# Patient Record
Sex: FEMALE | Race: WHITE | URBAN - NONMETROPOLITAN AREA
[De-identification: names, ages, dates, MRNs, and addresses within clinical notes are randomized per-mention and may not be internally consistent; named-entity substitution may affect disease eponyms.]

---

## 2020-10-02 ENCOUNTER — OFFICE VISIT (OUTPATIENT)
Dept: PRIMARY CARE CLINIC | Age: 55
End: 2020-10-02
Payer: COMMERCIAL

## 2020-10-02 VITALS
HEART RATE: 68 BPM | TEMPERATURE: 98.6 F | WEIGHT: 184.4 LBS | HEIGHT: 63 IN | SYSTOLIC BLOOD PRESSURE: 116 MMHG | DIASTOLIC BLOOD PRESSURE: 78 MMHG | BODY MASS INDEX: 32.67 KG/M2

## 2020-10-02 PROCEDURE — 87880 STREP A ASSAY W/OPTIC: CPT | Performed by: NURSE PRACTITIONER

## 2020-10-02 PROCEDURE — 99203 OFFICE O/P NEW LOW 30 MIN: CPT | Performed by: NURSE PRACTITIONER

## 2020-10-02 PROCEDURE — 96372 THER/PROPH/DIAG INJ SC/IM: CPT | Performed by: NURSE PRACTITIONER

## 2020-10-02 RX ORDER — CHOLECALCIFEROL (VITAMIN D3) 125 MCG
CAPSULE ORAL
COMMUNITY
Start: 2020-07-22

## 2020-10-02 RX ORDER — ALPRAZOLAM 0.25 MG/1
TABLET ORAL
COMMUNITY
Start: 2020-09-17

## 2020-10-02 RX ORDER — AZITHROMYCIN 500 MG/1
500 TABLET, FILM COATED ORAL DAILY
Qty: 5 TABLET | Refills: 0 | Status: SHIPPED | OUTPATIENT
Start: 2020-10-02 | End: 2020-10-07

## 2020-10-02 RX ORDER — AZITHROMYCIN 500 MG/1
500 TABLET, FILM COATED ORAL DAILY
Qty: 5 TABLET | Refills: 0 | Status: SHIPPED | OUTPATIENT
Start: 2020-10-02 | End: 2020-10-02

## 2020-10-02 RX ORDER — TRIAMCINOLONE ACETONIDE 40 MG/ML
40 INJECTION, SUSPENSION INTRA-ARTICULAR; INTRAMUSCULAR ONCE
Status: COMPLETED | OUTPATIENT
Start: 2020-10-02 | End: 2020-10-02

## 2020-10-02 RX ORDER — DIVALPROEX SODIUM 500 MG/1
TABLET, DELAYED RELEASE ORAL
COMMUNITY
Start: 2020-09-29

## 2020-10-02 RX ORDER — ATENOLOL 25 MG/1
TABLET ORAL
COMMUNITY
Start: 2020-09-06

## 2020-10-02 RX ORDER — ESCITALOPRAM OXALATE 20 MG/1
TABLET ORAL
COMMUNITY
Start: 2020-09-16

## 2020-10-02 RX ADMIN — TRIAMCINOLONE ACETONIDE 40 MG: 40 INJECTION, SUSPENSION INTRA-ARTICULAR; INTRAMUSCULAR at 10:08

## 2020-10-02 ASSESSMENT — ENCOUNTER SYMPTOMS
CHEST TIGHTNESS: 0
SHORTNESS OF BREATH: 0
SWOLLEN GLANDS: 1
COUGH: 1
ABDOMINAL PAIN: 0
VOMITING: 0
SORE THROAT: 1

## 2020-10-02 ASSESSMENT — PATIENT HEALTH QUESTIONNAIRE - PHQ9
SUM OF ALL RESPONSES TO PHQ9 QUESTIONS 1 & 2: 0
SUM OF ALL RESPONSES TO PHQ QUESTIONS 1-9: 0
SUM OF ALL RESPONSES TO PHQ QUESTIONS 1-9: 0
1. LITTLE INTEREST OR PLEASURE IN DOING THINGS: 0
DEPRESSION UNABLE TO ASSESS: PT REFUSES
2. FEELING DOWN, DEPRESSED OR HOPELESS: 0

## 2020-10-02 NOTE — PATIENT INSTRUCTIONS
New medication added today and was advised on the risks and benefits of the medication. Pt was instructed on the proper use/technique of the medication. Pt agrees to try the new medication as prescribed and feels able to comply with treatment plan today. No barriers for treatment plan found today. Pt will let us know if any difficulty obtaining new medication or with any other concerns. SYMPTOMATIC COVID SCREEN . You were screened for COVID today and swabbed. You will be called with the results and any indicated treatments. You were provided with written CDC isolation/quarantine guidelines. Steroid INJ today for rash and cough as discussed. FU with your PCP in your hometown.

## 2020-10-02 NOTE — PROGRESS NOTES
[facility] [address] [facility]  Dept: [phone]  Dept Fax: [phone]  Loc: [phone]    Ita Etienne is a 50 y.o. female who presents today for her medical conditions/complaintsas noted below. Ita Etienne is c/o of Pharyngitis (x 4 days); Congestion (x 4 days); and Skin Problem (Patient states she has muliple lesion on Left shoulder x 1 week)      HPI:   Patient notes is here from out of town taking care of her mother, notes congestion, sinus pain, rash over left shoulder. Afebrile, notes has worsened over this week. Notes cough is burning, agreeable to Covid  testing. Pharyngitis   This is a new problem. The current episode started in the past 7 days. The problem has been gradually worsening. Associated symptoms include chills, congestion, coughing, fatigue, a fever, headaches, myalgias, a rash, a sore throat and swollen glands. Pertinent negatives include no abdominal pain, chest pain or vomiting. Nothing aggravates the symptoms. She has tried nothing for the symptoms. The treatment provided no relief. History reviewed. No pertinent past medical history. History reviewed. No pertinent surgical history. History reviewed. No pertinent family history. Social History     Tobacco Use    Smoking status: Not on file    Smokeless tobacco: Former User   Substance Use Topics    Alcohol use: Not on file      Current Outpatient Medications on File Prior to Visit   Medication Sig Dispense Refill    ALPRAZolam (XANAX) 0.25 MG tablet TAKE 1 TABLET BY MOUTH AT BEDTIME      atenolol (TENORMIN) 25 MG tablet TAKE 1 TABLET BY MOUTH EVERY DAY FOR TREMORS      [employer] D3 125 MCG (5000 UT) CAPS capsule TAKE 1 CAPSULE BY MOUTH EVERY DAY      escitalopram (LEXAPRO) 20 MG tablet TAKE 1 TABLET BY MOUTH EVERY DAY      divalproex (DEPAKOTE) 500 MG DR tablet        No current facility-administered medications on file prior to visit. Allergies   Allergen Reactions    Diazepam Other (See Comments)     Health Maintenance   Topic Date Due    HIV screen  03/18/1982    DTaP/Tdap/Td vaccine (1 - Tdap) 03/18/1986    Cervical cancer screen  03/18/1988    Lipid screen  03/18/2007    Diabetes screen  03/18/2007    Breast cancer screen  03/18/2017    Shingles Vaccine (1 of 2) 03/18/2017    Colon cancer screen colonoscopy  03/18/2017    Flu vaccine (1) 09/01/2020    Hepatitis A vaccine  Aged Out    Hepatitis B vaccine  Aged Out    Hib vaccine  Aged Out    Meningococcal (ACWY) vaccine  Aged Out    Pneumococcal 0-64 years Vaccine  Aged Out       Subjective:   Review of Systems   Constitutional: Positive for chills, fatigue and fever. HENT: Positive for congestion and sore throat. Negative for ear pain. Respiratory: Positive for cough. Negative for chest tightness and shortness of breath. Cardiovascular: Negative for chest pain. Gastrointestinal: Negative for abdominal pain and vomiting. Musculoskeletal: Positive for myalgias. Skin: Positive for rash. Neurological: Positive for headaches. Hematological: Negative for adenopathy. Objective:   /78 (Site: Right Upper Arm, Position: Sitting)   Pulse 68   Temp 98.6 °F (37 °C) (Temporal)   Ht 5' 3\" (1.6 m)   Wt 184 lb 6.4 oz (83.6 kg)   BMI 32.66 kg/m²    Physical Exam  Vitals signs and nursing note reviewed. Constitutional:       General: She is not in acute distress. Appearance: Normal appearance. She is not ill-appearing. HENT:      Head: Normocephalic. Mouth/Throat:      Pharynx: Pharyngeal swelling, posterior oropharyngeal erythema and uvula swelling present. No oropharyngeal exudate. Comments: petechiae oropharynx  Eyes:      Pupils: Pupils are equal, round, and reactive to light. Cardiovascular:      Rate and Rhythm: Normal rate and regular rhythm.    Pulmonary:      Effort: Pulmonary effort is normal.      Breath sounds: Normal breath sounds. No wheezing or rhonchi. Lymphadenopathy:      Cervical: Cervical adenopathy present. Skin:     General: Skin is warm and dry. Neurological:      Mental Status: She is alert and oriented to person, place, and time. No results found for this visit on 10/02/20. Assessment:      Diagnosis Orders   1. Pharyngitis, unspecified etiology  POCT rapid strep A    azithromycin (ZITHROMAX) 500 MG tablet       Plan:   Hernandez Cornell was seen today for pharyngitis, congestion and skin problem. Diagnoses and all orders for this visit:    Pharyngitis, unspecified etiology  -     POCT rapid strep A  -     azithromycin (ZITHROMAX) 500 MG tablet; Take 1 tablet by mouth daily for 5 days    Other orders  -     triamcinolone acetonide (KENALOG-40) injection 40 mg       No follow-ups on file. After obtaining consent, injection given as documented in [facility]. Patient tolerated Injection and at time of discharge stable and ambulatory. Patient instructed to remain in clinic for 15 - 20 minutes afterwards, and to report any adverse reaction to me immediately. New medication added today and was advised on the risks and benefits of the medication. Pt was instructed on the proper use/technique of the medication. Pt agrees to try the new medication as prescribed and feels able to comply with treatment plan today. No barriers for treatment plan found today. Pt will let us know if any difficulty obtaining new medication or with any other concerns. SYMPTOMATIC COVID SCREEN . You were screened for COVID today and swabbed. You will be called with the results and any indicated treatments. You were provided with written CDC isolation/quarantine guidelines. Patient given educational materials- see patient instructions. Discussed use, benefit, and side effects of prescribedmedications. All patient questions answered. Pt voiced understanding.      Electronically signed by Mendel Bridgeman, APRN - CNP on 10/2/2020 at 10:06 AM

## 2020-10-02 NOTE — LETTER
[facility] ([facility] [address] [address] [facility]  Phone: [phone]  Fax: [phone]    GUILLAUME Mora CNP        October 2, 2020     Patient: Ashlee Scherer   YOB: 1965   Date of Visit: 10/2/2020       To Whom it May Concern:    Isi Boyce was seen in my clinic on 10/2/2020. She may return to work on 10/6/2020. If you have any questions or concerns, please don't hesitate to call.     Sincerely,         GUILLAUME Mora CNP

## 2020-10-05 ENCOUNTER — TELEPHONE (OUTPATIENT)
Dept: PRIMARY CARE CLINIC | Age: 55
End: 2020-10-05

## 2020-10-05 NOTE — TELEPHONE ENCOUNTER
Patient diatherix results reviewed and found to be COVID negative. FU as directed at the time of visit.